# Patient Record
Sex: FEMALE | Race: WHITE
[De-identification: names, ages, dates, MRNs, and addresses within clinical notes are randomized per-mention and may not be internally consistent; named-entity substitution may affect disease eponyms.]

---

## 2021-03-10 ENCOUNTER — HOSPITAL ENCOUNTER (EMERGENCY)
Dept: HOSPITAL 61 - ER | Age: 56
Discharge: HOME | End: 2021-03-10
Payer: MEDICARE

## 2021-03-10 VITALS — HEIGHT: 65 IN | BODY MASS INDEX: 28.39 KG/M2 | WEIGHT: 170.42 LBS

## 2021-03-10 VITALS — DIASTOLIC BLOOD PRESSURE: 59 MMHG | SYSTOLIC BLOOD PRESSURE: 110 MMHG

## 2021-03-10 DIAGNOSIS — Z98.890: ICD-10-CM

## 2021-03-10 DIAGNOSIS — I10: ICD-10-CM

## 2021-03-10 DIAGNOSIS — R51.9: ICD-10-CM

## 2021-03-10 DIAGNOSIS — Z76.0: ICD-10-CM

## 2021-03-10 DIAGNOSIS — G89.29: Primary | ICD-10-CM

## 2021-03-10 PROCEDURE — 99281 EMR DPT VST MAYX REQ PHY/QHP: CPT

## 2021-03-10 PROCEDURE — 99283 EMERGENCY DEPT VISIT LOW MDM: CPT

## 2021-03-10 NOTE — PHYS DOC
Past Medical History


Past Medical History:  Hypertension, Other


Additional Past Medical Histor:  HEADACHES,


Past Surgical History:  Other


Additional Past Surgical Histo:  RIGHT SIDED BRAIN SURGERY


Smoking Status:  Never Smoker


Alcohol Use:  Occasionally


Social History Narrative:  TRAMADOL 3 TIMES A DAY FOR HEADACHES.





General Adult


EDM:


Chief Complaint:  HEADACHE





HPI:


HPI:





Patient is a 55  year old female with a history of headaches, hypertension, who 

presents to the ED today complaining of chronic headaches and requesting tr

amadol.  Patient states she takes tramadol for her headaches and ran out of the 

medications a couple days ago.  She states she is  from [place] and does not 

have a PCP here.  Denies this being the worst headache in her life.  She rates 

the headache as mild and intermittent.  Denies any nausea vomiting, denies any 

photophobia.  She states all she needs a refill of tramadol





Review of Systems:


Review of Systems:


Constitutional:   Denies fever or chills. []


Eyes:   Denies change in visual acuity. []


HENT:   Denies nasal congestion or sore throat. [] 


Respiratory:   Denies cough or shortness of breath. [] 


Cardiovascular:   Denies chest pain or edema. [] 


GI:   Denies abdominal pain, nausea, vomiting, bloody stools or diarrhea. [] 


:  Denies dysuria. [] 


Musculoskeletal:   Denies back pain or joint pain. [] 


Integument:   Denies rash. [] 


Neurologic:   Reports headache, denies focal weakness or sensory changes. [] 


Psychiatric:  Denies depression or anxiety. []





Heart Score:


C/O Chest Pain:  N/A


Risk Factors:


Risk Factors:  DM, Current or recent (<one month) smoker, HTN, HLP, family h

istory of CAD, obesity.


Risk Scores:


Score 0 - 3:  2.5% MACE over next 6 weeks - Discharge Home


Score 4 - 6:  20.3% MACE over next 6 weeks - Admit for Clinical Observation


Score 7 - 10:  72.7% MACE over next 6 weeks - Early Invasive Strategies





Current Medications:





Current Medications








 Medications


  (Trade)  Dose


 Ordered  Sig/Trav  Start Time


 Stop Time Status Last Admin


Dose Admin


 


 Tramadol HCl


  (Ultram)  50 mg  1X  ONCE  3/10/21 20:00


 3/10/21 20:01   














Allergies:


Allergies:





Allergies








Coded Allergies Type Severity Reaction Last Updated Verified


 


  No Known Drug Allergies    3/10/21 No











Physical Exam:


PE:





Constitutional: Well developed, well nourished, no acute distress, non-toxic 

appearance. []


HENT: Normocephalic, atraumatic, bilateral external ears normal, oropharynx 

moist, no oral exudates, nose normal. []


Eyes: PERRLA, EOMI, conjunctiva normal, no discharge. [] 


Neck: Normal range of motion, no tenderness, supple, no stridor. [] 


Cardiovascular:Heart rate regular rhythm, no murmur []


Lungs & Thorax:  Bilateral breath sounds clear to auscultation []


Abdomen: Bowel sounds normal, soft, no tenderness, no masses, no pulsatile 

masses. [] 


Skin: Warm, dry, no erythema, no rash. [] 


Back: No tenderness, no CVA tenderness. [] 


Extremities: No tenderness, no cyanosis, no clubbing, ROM intact, no edema. [] 


Neurologic: Alert and oriented X 3, normal motor function, normal sensory 

function, no focal deficits noted.  Cranial nerves II through XII intact


Psychologic: Affect normal, judgement normal, mood normal. []





Current Patient Data:


Vital Signs:





                                   Vital Signs








  Date Time  Temp Pulse Resp B/P (MAP) Pulse Ox O2 Delivery O2 Flow Rate FiO2


 


3/10/21 19:15 98.7 78 20 126/81 (96) 94 Room Air  





 98.7       











EKG:


EKG:


[]





Radiology/Procedures:


Radiology/Procedures:


[]





Course & Med Decision Making:


Course & Med Decision Making


Pertinent Labs and Imaging studies reviewed. (See chart for details)





This is a 55-year-old female patient with history of chronic headaches 

presenting today requesting a refill of tramadol, she states she is from 

[place] and ran out of tramadol a couple days ago.  Informed patient we do not 

refill any narcotic medicines.  Provided her doctors list for follow-up as an 

outpatient.





Antwon Disclaimer:


Dragon Disclaimer:


This electronic medical record was generated, in whole or in part, using a voice

 recognition dictation system.





Departure


Departure


Impression:  


   Primary Impression:  


   Headache


   Qualified Codes:  R51.9 - Headache, unspecified; G89.29 - Other chronic pain


   Additional Impression:  


   Medication refill


Disposition:  01 DC HOME SELF CARE/HOMELESS


Condition:  STABLE


Referrals:  


NO PCP (PCP)








KEIKO HOLDEN MD


follow up in 1-2 weeks


Patient Instructions:  Headache, FAQs





Additional Instructions:  


You were seen for a chronic headache.  Please follow-up with a doctor from the 

list provided or your own primary care doctor











HERLINDA ROSALES              Mar 10, 2021 20:01